# Patient Record
Sex: MALE | Race: WHITE | NOT HISPANIC OR LATINO | Employment: FULL TIME | URBAN - METROPOLITAN AREA
[De-identification: names, ages, dates, MRNs, and addresses within clinical notes are randomized per-mention and may not be internally consistent; named-entity substitution may affect disease eponyms.]

---

## 2020-09-07 ENCOUNTER — APPOINTMENT (OUTPATIENT)
Dept: RADIOLOGY | Facility: MEDICAL CENTER | Age: 55
End: 2020-09-07
Payer: COMMERCIAL

## 2020-09-07 ENCOUNTER — HOSPITAL ENCOUNTER (EMERGENCY)
Facility: MEDICAL CENTER | Age: 55
End: 2020-09-07
Attending: EMERGENCY MEDICINE
Payer: COMMERCIAL

## 2020-09-07 ENCOUNTER — APPOINTMENT (OUTPATIENT)
Dept: RADIOLOGY | Facility: MEDICAL CENTER | Age: 55
End: 2020-09-07
Attending: EMERGENCY MEDICINE
Payer: COMMERCIAL

## 2020-09-07 VITALS
HEIGHT: 72 IN | BODY MASS INDEX: 29.89 KG/M2 | RESPIRATION RATE: 18 BRPM | HEART RATE: 55 BPM | OXYGEN SATURATION: 95 % | TEMPERATURE: 98.7 F | SYSTOLIC BLOOD PRESSURE: 159 MMHG | DIASTOLIC BLOOD PRESSURE: 76 MMHG | WEIGHT: 220.68 LBS

## 2020-09-07 DIAGNOSIS — R10.9 ABDOMINAL PAIN, UNSPECIFIED ABDOMINAL LOCATION: ICD-10-CM

## 2020-09-07 DIAGNOSIS — K29.70 GASTRITIS WITHOUT BLEEDING, UNSPECIFIED CHRONICITY, UNSPECIFIED GASTRITIS TYPE: ICD-10-CM

## 2020-09-07 LAB
ALBUMIN SERPL BCP-MCNC: 5 G/DL (ref 3.2–4.9)
ALBUMIN/GLOB SERPL: 1.7 G/DL
ALP SERPL-CCNC: 55 U/L (ref 30–99)
ALT SERPL-CCNC: 23 U/L (ref 2–50)
ANION GAP SERPL CALC-SCNC: 15 MMOL/L (ref 7–16)
AST SERPL-CCNC: 19 U/L (ref 12–45)
BASOPHILS # BLD AUTO: 0.4 % (ref 0–1.8)
BASOPHILS # BLD: 0.06 K/UL (ref 0–0.12)
BILIRUB SERPL-MCNC: 0.6 MG/DL (ref 0.1–1.5)
BUN SERPL-MCNC: 13 MG/DL (ref 8–22)
CALCIUM SERPL-MCNC: 10.9 MG/DL (ref 8.5–10.5)
CHLORIDE SERPL-SCNC: 96 MMOL/L (ref 96–112)
CO2 SERPL-SCNC: 23 MMOL/L (ref 20–33)
CREAT SERPL-MCNC: 1.15 MG/DL (ref 0.5–1.4)
EKG IMPRESSION: NORMAL
EOSINOPHIL # BLD AUTO: 0.18 K/UL (ref 0–0.51)
EOSINOPHIL NFR BLD: 1.3 % (ref 0–6.9)
ERYTHROCYTE [DISTWIDTH] IN BLOOD BY AUTOMATED COUNT: 43.1 FL (ref 35.9–50)
GLOBULIN SER CALC-MCNC: 2.9 G/DL (ref 1.9–3.5)
GLUCOSE SERPL-MCNC: 118 MG/DL (ref 65–99)
HCT VFR BLD AUTO: 46.8 % (ref 42–52)
HGB BLD-MCNC: 16.3 G/DL (ref 14–18)
IMM GRANULOCYTES # BLD AUTO: 0.05 K/UL (ref 0–0.11)
IMM GRANULOCYTES NFR BLD AUTO: 0.4 % (ref 0–0.9)
LIPASE SERPL-CCNC: 47 U/L (ref 11–82)
LYMPHOCYTES # BLD AUTO: 2.65 K/UL (ref 1–4.8)
LYMPHOCYTES NFR BLD: 19.3 % (ref 22–41)
MCH RBC QN AUTO: 31.5 PG (ref 27–33)
MCHC RBC AUTO-ENTMCNC: 34.8 G/DL (ref 33.7–35.3)
MCV RBC AUTO: 90.5 FL (ref 81.4–97.8)
MONOCYTES # BLD AUTO: 0.96 K/UL (ref 0–0.85)
MONOCYTES NFR BLD AUTO: 7 % (ref 0–13.4)
NEUTROPHILS # BLD AUTO: 9.81 K/UL (ref 1.82–7.42)
NEUTROPHILS NFR BLD: 71.6 % (ref 44–72)
NRBC # BLD AUTO: 0 K/UL
NRBC BLD-RTO: 0 /100 WBC
PLATELET # BLD AUTO: 301 K/UL (ref 164–446)
PMV BLD AUTO: 11 FL (ref 9–12.9)
POTASSIUM SERPL-SCNC: 3.8 MMOL/L (ref 3.6–5.5)
PROT SERPL-MCNC: 7.9 G/DL (ref 6–8.2)
RBC # BLD AUTO: 5.17 M/UL (ref 4.7–6.1)
SODIUM SERPL-SCNC: 134 MMOL/L (ref 135–145)
TROPONIN T SERPL-MCNC: 10 NG/L (ref 6–19)
WBC # BLD AUTO: 13.7 K/UL (ref 4.8–10.8)

## 2020-09-07 PROCEDURE — 96375 TX/PRO/DX INJ NEW DRUG ADDON: CPT

## 2020-09-07 PROCEDURE — 96376 TX/PRO/DX INJ SAME DRUG ADON: CPT

## 2020-09-07 PROCEDURE — 71045 X-RAY EXAM CHEST 1 VIEW: CPT

## 2020-09-07 PROCEDURE — A9270 NON-COVERED ITEM OR SERVICE: HCPCS | Performed by: EMERGENCY MEDICINE

## 2020-09-07 PROCEDURE — 96374 THER/PROPH/DIAG INJ IV PUSH: CPT

## 2020-09-07 PROCEDURE — 93005 ELECTROCARDIOGRAM TRACING: CPT

## 2020-09-07 PROCEDURE — 85025 COMPLETE CBC W/AUTO DIFF WBC: CPT

## 2020-09-07 PROCEDURE — 76705 ECHO EXAM OF ABDOMEN: CPT

## 2020-09-07 PROCEDURE — 700111 HCHG RX REV CODE 636 W/ 250 OVERRIDE (IP): Performed by: EMERGENCY MEDICINE

## 2020-09-07 PROCEDURE — C9113 INJ PANTOPRAZOLE SODIUM, VIA: HCPCS | Performed by: EMERGENCY MEDICINE

## 2020-09-07 PROCEDURE — 84484 ASSAY OF TROPONIN QUANT: CPT

## 2020-09-07 PROCEDURE — 700102 HCHG RX REV CODE 250 W/ 637 OVERRIDE(OP): Performed by: EMERGENCY MEDICINE

## 2020-09-07 PROCEDURE — 99285 EMERGENCY DEPT VISIT HI MDM: CPT

## 2020-09-07 PROCEDURE — 93005 ELECTROCARDIOGRAM TRACING: CPT | Performed by: EMERGENCY MEDICINE

## 2020-09-07 PROCEDURE — 80053 COMPREHEN METABOLIC PANEL: CPT

## 2020-09-07 PROCEDURE — 83690 ASSAY OF LIPASE: CPT

## 2020-09-07 RX ORDER — FAMOTIDINE 20 MG/1
20 TABLET, FILM COATED ORAL DAILY
Qty: 30 TAB | Refills: 0 | Status: SHIPPED | OUTPATIENT
Start: 2020-09-07 | End: 2020-10-07

## 2020-09-07 RX ORDER — PANTOPRAZOLE SODIUM 40 MG/10ML
40 INJECTION, POWDER, LYOPHILIZED, FOR SOLUTION INTRAVENOUS ONCE
Status: COMPLETED | OUTPATIENT
Start: 2020-09-07 | End: 2020-09-07

## 2020-09-07 RX ORDER — ONDANSETRON 4 MG/1
4 TABLET, ORALLY DISINTEGRATING ORAL EVERY 6 HOURS PRN
Qty: 20 TAB | Refills: 0 | Status: SHIPPED | OUTPATIENT
Start: 2020-09-07

## 2020-09-07 RX ORDER — ALUMINA, MAGNESIA, AND SIMETHICONE 2400; 2400; 240 MG/30ML; MG/30ML; MG/30ML
10 SUSPENSION ORAL 4 TIMES DAILY PRN
Qty: 560 ML | Refills: 0 | Status: SHIPPED | OUTPATIENT
Start: 2020-09-07

## 2020-09-07 RX ORDER — ASPIRIN 81 MG/1
324 TABLET, CHEWABLE ORAL ONCE
Status: COMPLETED | OUTPATIENT
Start: 2020-09-07 | End: 2020-09-07

## 2020-09-07 RX ORDER — ONDANSETRON 2 MG/ML
4 INJECTION INTRAMUSCULAR; INTRAVENOUS ONCE
Status: COMPLETED | OUTPATIENT
Start: 2020-09-07 | End: 2020-09-07

## 2020-09-07 RX ADMIN — PANTOPRAZOLE SODIUM 40 MG: 40 INJECTION, POWDER, LYOPHILIZED, FOR SOLUTION INTRAVENOUS at 23:03

## 2020-09-07 RX ADMIN — ONDANSETRON 4 MG: 2 INJECTION INTRAMUSCULAR; INTRAVENOUS at 20:54

## 2020-09-07 RX ADMIN — LIDOCAINE HYDROCHLORIDE 30 ML: 20 SOLUTION OROPHARYNGEAL at 20:55

## 2020-09-07 RX ADMIN — FENTANYL CITRATE 50 MCG: 50 INJECTION INTRAMUSCULAR; INTRAVENOUS at 20:54

## 2020-09-07 RX ADMIN — ASPIRIN 324 MG: 81 TABLET, CHEWABLE ORAL at 20:55

## 2020-09-07 RX ADMIN — FENTANYL CITRATE 100 MCG: 50 INJECTION INTRAMUSCULAR; INTRAVENOUS at 22:27

## 2020-09-07 ASSESSMENT — ENCOUNTER SYMPTOMS
FEVER: 0
ABDOMINAL PAIN: 1
SHORTNESS OF BREATH: 0
HEADACHES: 0
DIZZINESS: 0
VOMITING: 1

## 2020-09-07 ASSESSMENT — HEART SCORE
ECG: NORMAL
AGE: 45-64
ECG: NORMAL
HISTORY: SLIGHTLY SUSPICIOUS
HEART SCORE: 2
TROPONIN: LESS THAN OR EQUAL TO NORMAL LIMIT
AGE: 45-64
RISK FACTORS: 1-2 RISK FACTORS
HISTORY: SLIGHTLY SUSPICIOUS
RISK FACTORS: 1-2 RISK FACTORS

## 2020-09-07 ASSESSMENT — PAIN DESCRIPTION - DESCRIPTORS: DESCRIPTORS: SHARP;STABBING

## 2020-09-08 NOTE — ED NOTES
Discharge teaching and paperwork provided regarding gastritis and all questions/concerns answered. VSS, abdominal assessment stable and PIV removed. Given information regarding three Rx medications. Patient discharged to the care of his partner and ambulated out of the ED.

## 2020-09-08 NOTE — ED PROVIDER NOTES
ED Provider Note    Scribed for Miryam Mckeon M.D. by Shaheen Emerson. 9/7/2020, 8:42 PM.    Primary care provider: Bairon OSORIO M.D.  Means of arrival: Walk in  History obtained from: patient  History limited by: none    CHIEF COMPLAINT  Chief Complaint   Patient presents with   • Epigastric Pain     PT reports epigastric pain getting worse ove past 4 hours after vomiting x 1 today.   PPE Note: I personally donned PPE for all patient encounters during this visit, including a surgical mask and gloves. Scribe remained outside the patient's room and did not have any contact with the patient for the duration of patient encounter.      HPI  Clyde Atkins is a 54 y.o. male who presents to the Emergency Department with worsening epigastric pain onset this morning.  This morning patient ate 2 sausage burritos from the fast food joint and subsequently his symptoms started shortly after.  He describes the pain as moderate burning that comes in severe waves intermittently. The patient reports additional symptoms of vomiting.The patient has hyperlipidemia and has a P.A that he has been seeing for 25 years.  Has no known history of cardiac disease.  Is otherwise generally healthy.    REVIEW OF SYSTEMS  Review of Systems   Constitutional: Negative for fever.   Respiratory: Negative for shortness of breath.    Cardiovascular: Negative for chest pain.   Gastrointestinal: Positive for abdominal pain (Epigastric) and vomiting.   Genitourinary: Negative for dysuria.   Neurological: Negative for dizziness and headaches.   All other systems reviewed and are negative.    PAST MEDICAL HISTORY   has a past medical history of Hyperlipidemia.    SURGICAL HISTORY  patient denies any surgical history    SOCIAL HISTORY  Social History     Tobacco Use   • Smoking status: Never Smoker   Substance Use Topics   • Alcohol use: Yes     Comment: occ   • Drug use: No      Social History     Substance and Sexual Activity   Drug Use No        FAMILY HISTORY  None pertinent    CURRENT MEDICATIONS  Current Outpatient Medications   Medication Instructions   • HYDROCODONE-ACETAMINOPHEN 5-500 MG PO TABS 1-2 Tabs, Oral, EVERY 4 HOURS PRN   • LIPITOR PO DAILY   • Pseudoeph-Doxylamine-DM-APAP (NYQUIL PO) Take  by mouth.   • Pseudoephedrine-APAP-DM (DAYQUIL PO) Take  by mouth.   • TRICOR PO Take  by mouth.   • Zolpidem Tartrate (AMBIEN PO) Take  by mouth.       ALLERGIES  Allergies   Allergen Reactions   • Nkda [No Known Drug Allergy]        PHYSICAL EXAM  VITAL SIGNS: BP (!) 182/72   Pulse 72   Temp 36.5 °C (97.7 °F) (Oral)   Resp 18   Ht 1.829 m (6')   Wt 100.1 kg (220 lb 10.9 oz)   SpO2 97%   BMI 29.93 kg/m²   Vitals reviewed by myself.  Physical Exam   Nursing note and vitals reviewed.  Constitutional: Well-developed and well-nourished. No acute distress.   HENT: Head is normocephalic and atraumatic.  Eyes: extra-ocular movements intact  Cardiovascular: Regular rate and regular rhythm. No murmur heard.  Pulmonary/Chest: Breath sounds normal. No wheezes or rales.   Abdominal: Soft. RUQ and epigastric tenderness.  No rebound, no guarding.  No distention.    Musculoskeletal: Extremities exhibit normal range of motion without edema or tenderness.   Neurological: Awake and alert  Skin: Skin is warm and dry. No rash.     DIAGNOSTIC STUDIES /  LABS  Labs Reviewed   CBC WITH DIFFERENTIAL - Abnormal; Notable for the following components:       Result Value    WBC 13.7 (*)     Lymphocytes 19.30 (*)     Neutrophils (Absolute) 9.81 (*)     Monos (Absolute) 0.96 (*)     All other components within normal limits   COMP METABOLIC PANEL - Abnormal; Notable for the following components:    Sodium 134 (*)     Glucose 118 (*)     Calcium 10.9 (*)     Albumin 5.0 (*)     All other components within normal limits   TROPONIN   LIPASE   ESTIMATED GFR       All labs reviewed by me.    EKG Interpretation:  Interpreted by myself    12 Lead EKG interpreted by me to  show:  EKG at 7 PM: Normal sinus rhythm, heart rate 60, normal axis, normal intervals, , QRS 86, QTc 416, no acute ST-T segment changes, no evidence of acute rhythm or ischemia, no prior EKG for comparison treatment  My impression of this EKG: Does not indicate ischemia or arrhythmia at this time.    RADIOLOGY  US-RUQ   Final Result      1. Echogenic liver, most commonly hepatic steatosis.      2. Cholelithiasis. No definite sonographic evidence for acute cholecystitis. If there is clinical concern, further evaluation with HIDA scan can be helpful.         DX-CHEST-PORTABLE (1 VIEW)   Final Result         1. No acute cardiopulmonary abnormalities are identified.        The radiologist's interpretation of all radiological studies have been reviewed by me.      REASSESSMENT  8:42 PM - Patient seen and examined at bedside. Discussed plan of care, including ordering lab work and imaging. Patient agrees to the plan of care. The patient will be medicated with Sublimaze 50 mcg,  mg, Zofran 4mg, and GI cocktail 30mL.    10:00 PM -  Recheck: Patient re-evaluated at beside. Patient reports feeling improved. Discussed patient's condition and treatment plan. Patient's lab and radiology results discussed. I informed the patient that his scans are indicative of gallstones. The patient understood and is in agreement.     11:00 PM - Recheck: Patient re-evaluated at beside. Patient reports feeling improved. Discussed patient's condition and treatment plan, including my plans for discharge. The patient understood and is in agreement.       The patient is referred to a primary physician for blood pressure management, diabetic screening, and for all other preventive health concerns.      COURSE & MEDICAL DECISION MAKING  Nursing notes, VS, PMSFHx reviewed in chart.    Patient is a 54-year-old male who comes in for evaluation of epigastric pain.  Differential diagnosis includes gastritis, foodborne illness, cholecystitis,  cholelithiasis, atypical presentation of acute coronary syndrome.  Diagnostic work-up includes labs, right upper quadrant ultrasound, chest x-ray, EKG and troponin.    Patient's initial vitals notable for hypertension likely secondary to pain.  Patient is treated with fentanyl, Zofran and GI cocktail after which he feels improved.  He is also treated with aspirin for possible atypical presentation of acute coronary syndrome.  EKG returns demonstrates no evidence of acute arrhythmia or ischemia.  Labs returned and troponin is within normal limits.  Patient symptoms have been ongoing for greater than 6 hours and therefore I believe 1 troponin and EKG are sufficient to rule out atypical acute coronary syndrome.  Patient has a HEART score of 2 making him low risk for acute coronary syndrome.  Patient's labs returned and are otherwise unremarkable.  Chest x-ray demonstrates no acute cardiopulmonary processes.  Right upper quadrant ultrasound demonstrates cholelithiasis, however there is no evidence of biliary obstruction as his LFTs are within normal limits as is his common bile duct.  There is no gallbladder wall thickening and no pericholecystic fluid, therefore there is not acute cholecystitis.  Patient's white count is mildly elevated likely secondary to vomiting.  Upon reassessment patient is feeling slightly improved, his pain is starting to return and he is further treated with fentanyl and pantoprazole with great improvement in his symptoms.  Therefore he is reassured and advised on symptomatic management of likely gastritis.  He is provided with prescriptions for Maalox, Zofran and famotidine and advised to follow-up with PCP.  He is given strict return precautions and discharged in stable condition.    The patient will return for new or worsening symptoms and is stable at the time of discharge.    The patient is referred to a primary physician for blood pressure management, diabetic screening, and for all other  preventative health concerns.    DISPOSITION:  Patient will be discharged home in stable condition.    FOLLOW UP:  Bairon OSORIO M.D.  [address]  [facility] [id number] [phone]          [facility]  [address]  [place] [address]  [phone]          OUTPATIENT MEDICATIONS:  New Prescriptions    FAMOTIDINE (PEPCID) 20 MG TAB    Take 1 Tab by mouth every day for 30 days.    MAG HYDROX-AL HYDROX-SIMETH (MAALOX PLUS ES OR MYLANTA DS) 400-400-40 MG/5ML SUSPENSION    Take 10 mL by mouth 4 times a day as needed (Abdominal pain).    ONDANSETRON (ZOFRAN ODT) 4 MG TABLET DISPERSIBLE    Take 1 Tab by mouth every 6 hours as needed for Nausea.       FINAL IMPRESSION  1. Abdominal pain, unspecified abdominal location    2. Gastritis without bleeding, unspecified chronicity, unspecified gastritis type        I, Shaheen Emerson (Scribe), am scribing for, and in the presence of, Miryam Mckeon M.D..    Electronically signed by: Shaheen Emerson (Scribe), 9/7/2020    IMiryam M.D. personally performed the services described in this documentation, as scribed by Shaheen Emerson in my presence, and it is both accurate and complete.    C    The note accurately reflects work and decisions made by me.  Miryam Mckeon M.D.  9/7/2020  11:20 PM

## 2020-09-08 NOTE — ED NOTES
Patient ambulatory back to room for triaged complaint. Patient states he had two large sausage burritos this morning around 9am and then around 1300 he started to get upset stomach and vomited multiple times later this afternoon, which is what brought him into the ED. No abdominal history, and has very specific pain upper epigastric right in the middle.

## 2020-09-08 NOTE — ED NOTES
Rounded on patient who states his pain improved initially with the medication but has now come back to his initial discomfort level of 5/10 mid-epigastric. The patient also states he did not seem to feel relief from the GI cocktail, but more from the IV fentanyl. VSS and patient updated on POC.

## 2021-03-15 DIAGNOSIS — Z23 NEED FOR VACCINATION: ICD-10-CM
